# Patient Record
Sex: FEMALE | Race: BLACK OR AFRICAN AMERICAN | NOT HISPANIC OR LATINO | Employment: FULL TIME
[De-identification: names, ages, dates, MRNs, and addresses within clinical notes are randomized per-mention and may not be internally consistent; named-entity substitution may affect disease eponyms.]

---

## 2018-01-07 ENCOUNTER — HEALTH MAINTENANCE LETTER (OUTPATIENT)
Age: 53
End: 2018-01-07

## 2018-11-29 ENCOUNTER — TRANSFERRED RECORDS (OUTPATIENT)
Dept: HEALTH INFORMATION MANAGEMENT | Facility: CLINIC | Age: 53
End: 2018-11-29

## 2018-11-29 LAB
HPV ABSTRACT: NORMAL
PAP-ABSTRACT: NORMAL
TSH SERPL-ACNC: 0.93 UIU/ML (ref 0.36–3.74)

## 2021-07-13 ENCOUNTER — RECORDS - HEALTHEAST (OUTPATIENT)
Dept: ADMINISTRATIVE | Facility: CLINIC | Age: 56
End: 2021-07-13

## 2021-07-21 ENCOUNTER — RECORDS - HEALTHEAST (OUTPATIENT)
Dept: ADMINISTRATIVE | Facility: CLINIC | Age: 56
End: 2021-07-21

## 2021-08-27 ENCOUNTER — LAB (OUTPATIENT)
Dept: FAMILY MEDICINE | Facility: CLINIC | Age: 56
End: 2021-08-27
Payer: COMMERCIAL

## 2021-08-27 DIAGNOSIS — Z20.822 ENCOUNTER FOR LABORATORY TESTING FOR COVID-19 VIRUS: Primary | ICD-10-CM

## 2021-08-27 LAB — SARS-COV-2 RNA RESP QL NAA+PROBE: NEGATIVE

## 2021-08-27 PROCEDURE — U0003 INFECTIOUS AGENT DETECTION BY NUCLEIC ACID (DNA OR RNA); SEVERE ACUTE RESPIRATORY SYNDROME CORONAVIRUS 2 (SARS-COV-2) (CORONAVIRUS DISEASE [COVID-19]), AMPLIFIED PROBE TECHNIQUE, MAKING USE OF HIGH THROUGHPUT TECHNOLOGIES AS DESCRIBED BY CMS-2020-01-R: HCPCS

## 2021-08-27 PROCEDURE — U0005 INFEC AGEN DETEC AMPLI PROBE: HCPCS

## 2022-01-06 ENCOUNTER — LAB (OUTPATIENT)
Dept: LAB | Facility: CLINIC | Age: 57
End: 2022-01-06
Payer: COMMERCIAL

## 2022-01-06 DIAGNOSIS — Z20.822 EXPOSURE TO 2019 NOVEL CORONAVIRUS: Primary | ICD-10-CM

## 2022-01-06 LAB — SARS-COV-2 RNA RESP QL NAA+PROBE: POSITIVE

## 2022-01-06 PROCEDURE — U0003 INFECTIOUS AGENT DETECTION BY NUCLEIC ACID (DNA OR RNA); SEVERE ACUTE RESPIRATORY SYNDROME CORONAVIRUS 2 (SARS-COV-2) (CORONAVIRUS DISEASE [COVID-19]), AMPLIFIED PROBE TECHNIQUE, MAKING USE OF HIGH THROUGHPUT TECHNOLOGIES AS DESCRIBED BY CMS-2020-01-R: HCPCS

## 2022-01-06 PROCEDURE — U0005 INFEC AGEN DETEC AMPLI PROBE: HCPCS

## 2022-01-07 ENCOUNTER — TELEPHONE (OUTPATIENT)
Dept: LAB | Facility: CLINIC | Age: 57
End: 2022-01-07
Payer: COMMERCIAL

## 2022-01-07 NOTE — TELEPHONE ENCOUNTER
"Coronavirus (COVID-19) Notification    Caller Name (Patient, parent, daughter/son, grandparent, etc)  Patient    Reason for call  Notify of Positive Coronavirus (COVID-19) lab results, assess symptoms,  review  [facility] recommendations    Lab Result    Lab test:  2019-nCoV rRt-PCR or SARS-CoV-2 PCR    Oropharyngeal AND/OR nasopharyngeal swabs is POSITIVE for 2019-nCoV RNA/SARS-COV-2 PCR (COVID-19 virus)    RN Recommendations/Instructions per [facility] Coronavirus COVID-19 recommendations    Brief introduction script  Introduce self then review script:  \"I am calling on behalf of [email or web address].  We were notified that your Coronavirus test (COVID-19) for was POSITIVE for the virus.  I have some information to relay to you but first I wanted to mention that the [place] Dept of Health will be contacting you shortly [it's possible MD already called Patient] to talk to you more about how you are feeling and other people you have had contact with who might now also have the virus.  Also,  [facility] is Partnering with the [facility] for Covid-19 research, you may be contacted directly by research staff.\"    Assessment (Inquire about Patient's current symptoms)   Assessment   Current Symptoms at time of phone call: (if no symptoms, document No symptoms] Sore throat cough and headache   Symptoms onset (if applicable) 01/6/22     If at time of call, Patients symptoms hare worsened, the Patient should contact 911 or have someone drive them to Emergency Dept promptly:      If Patient calling 911, inform 911 personal that you have tested positive for the Coronavirus (COVID-19).  Place mask on and await 911 to arrive.    If Emergency Dept, If possible, please have another adult drive you to the Emergency Dept but you need to wear mask when in contact with other people.      Monoclonal Antibody Administration    You may be eligible to receive a new treatment with a monoclonal antibody for preventing " hospitalization in patients at high risk for complications from COVID-19.   This medication is still experimental and available on a limited basis; it is given through an IV and must be given at an infusion center. Please note that not all people who are eligible will receive the medication since it is in limited supply.     Are you interested in being considered for this medication?  No.   Does the patient fit the criteria: Patient declined    Review information with Patient    Your result was positive. This means you have COVID-19 (coronavirus).  We have sent you a letter that reviews the information that I'll be reviewing with you now.    How can I protect others?    If you have symptoms: stay home and away from others (self-isolate) until:    You've had no fever--and no medicine that reduces fever--for 1 full day (24 hours). And       Your other symptoms have gotten better. For example, your cough or breathing has improved. And     At least 10 days have passed since your symptoms started. (If you've been told by a doctor that you have a weak immune system, wait 20 days.)     If you don't have symptoms: Stay home and away from others (self-isolate) until at least 10 days have passed since your first positive COVID-19 test. (Date test collected)    During this time:    Stay in your own room, including for meals. Use your own bathroom if you can.    Stay away from others in your home. No hugging, kissing or shaking hands. No visitors.     Don't go to work, school or anywhere else.     Clean  high touch  surfaces often (doorknobs, counters, handles, etc.). Use a household cleaning spray or wipes. You'll find a full list on the EPA website at www.epa.gov/pesticide-registration/list-n-disinfectants-use-against-sars-cov-2.     Cover your mouth and nose with a mask, tissue or other face covering to avoid spreading germs.    Wash your hands and face often with soap and water.    Make a list of people you have been in  close contact with recently, even if either of you wore a face covering.   - Start your list from 2 days before you became ill or had a positive test.  - Include anyone that was within 6 feet of you for a cumulative total of 15 minutes or more in 24 hours. (Example: if you sat next to Venkat for 5 minutes in the morning and 10 minutes in the afternoon, then you were in close contact for 15 minutes total that day. Venkat would be added to your list.)    A public health worker will call or text you. It is important that you answer. They will ask you questions about possible exposures to COVID-19, such as people you have been in direct contact with and places you have visited.    Tell the people on your list that you have COVID-19; they should stay away from others for 14 days starting from the last time they were in contact with you (unless you are told something different from a public health worker).     Caregivers in these groups are at risk for severe illness due to COVID-19:  o People 65 years and older  o People who live in a nursing home or long-term care facility  o People with chronic disease (lung, heart, cancer, diabetes, kidney, liver, immunologic)  o People who have a weakened immune system, including those who:  - Are in cancer treatment  - Take medicine that weakens the immune system, such as corticosteroids  - Had a bone marrow or organ transplant  - Have an immune deficiency  - Have poorly controlled HIV or AIDS  - Are obese (body mass index of 40 or higher)  - Smoke regularly    Caregivers should wear gloves while washing dishes, handling laundry and cleaning bedrooms and bathrooms.    Wash and dry laundry with special caution. Don't shake dirty laundry, and use the warmest water setting you can.    If you have a weakened immune system, ask your doctor about other actions you should take.    For more tips, go to www.cdc.gov/coronavirus/2019-ncov/downloads/10Things.pdf.    You should not go back to work  until you meet the guidelines above for ending your home isolation. You don't need to be retested for COVID-19 before going back to work--studies show that you won't spread the virus if it's been at least 10 days since your symptoms started (or 20 days, if you have a weak immune system).    Employers: This document serves as formal notice of your employee's medical guidelines for going back to work. They must meet the above guidelines before going back to work in person.    How can I take care of myself?    1. Get lots of rest. Drink extra fluids (unless a doctor has told you not to).    2. Take Tylenol (acetaminophen) for fever or pain. If you have liver or kidney problems, ask your family doctor if it's okay to take Tylenol.     Take either:     650 mg (two 325 mg pills) every 4 to 6 hours, or     1,000 mg (two 500 mg pills) every 8 hours as needed.     Note: Don't take more than 3,000 mg in one day. Acetaminophen is found in many medicines (both prescribed and over-the-counter medicines). Read all labels to be sure you don't take too much.    For children, check the Tylenol bottle for the right dose (based on their age or weight).    3. If you have other health problems (like cancer, heart failure, an organ transplant or severe kidney disease): Call your specialty clinic if you don't feel better in the next 2 days.    4. Know when to call 911: Emergency warning signs include:    Trouble breathing or shortness of breath    Pain or pressure in the chest that doesn't go away    Feeling confused like you haven't felt before, or not being able to wake up    Bluish-colored lips or face    5. Sign up for [employer]. We know it's scary to hear that you have COVID-19. We want to track your symptoms to make sure you're okay over the next 2 weeks. Please look for an email from [employer]--this is a free, online program that we'll use to keep in touch. To sign up, follow the link in the email. Learn more at  [email or web address].    Where can I get more information?    [facility]: [email or web address]    Coronavirus Basics: www.health.[facility].[place]/diseases/coronavirus/basics.html    What to Do If You're Sick: www.cdc.gov/coronavirus/2019-ncov/about/steps-when-sick.html    Ending Home Isolation: www.cdc.gov/coronavirus/2019-ncov/hcp/disposition-in-home-patients.html     Caring for Someone with COVID-19: www.cdc.gov/coronavirus/2019-ncov/if-you-are-sick/care-for-someone.html     [facility] clinical trials (COVID-19 research studies): clinicalaffairs.[facility]/n-clinical-trials     A Positive COVID-19 letter will be sent via [employer] or the mail. (Exception, no letters sent to Presurgerical/Preprocedure Patients)    Priti Sabillno LPN

## 2022-01-13 DIAGNOSIS — Z20.822 ENCOUNTER FOR LABORATORY TESTING FOR COVID-19 VIRUS: Primary | ICD-10-CM

## 2022-01-17 ENCOUNTER — LAB (OUTPATIENT)
Dept: LAB | Facility: CLINIC | Age: 57
End: 2022-01-17
Payer: COMMERCIAL

## 2022-01-17 DIAGNOSIS — Z20.822 ENCOUNTER FOR LABORATORY TESTING FOR COVID-19 VIRUS: ICD-10-CM

## 2022-01-17 LAB — SARS-COV-2 RNA RESP QL NAA+PROBE: NEGATIVE

## 2022-01-17 PROCEDURE — U0005 INFEC AGEN DETEC AMPLI PROBE: HCPCS

## 2022-01-17 PROCEDURE — U0003 INFECTIOUS AGENT DETECTION BY NUCLEIC ACID (DNA OR RNA); SEVERE ACUTE RESPIRATORY SYNDROME CORONAVIRUS 2 (SARS-COV-2) (CORONAVIRUS DISEASE [COVID-19]), AMPLIFIED PROBE TECHNIQUE, MAKING USE OF HIGH THROUGHPUT TECHNOLOGIES AS DESCRIBED BY CMS-2020-01-R: HCPCS

## 2022-04-25 ENCOUNTER — TELEPHONE (OUTPATIENT)
Dept: FAMILY MEDICINE | Facility: CLINIC | Age: 57
End: 2022-04-25
Payer: COMMERCIAL

## 2022-04-25 NOTE — TELEPHONE ENCOUNTER
Reason for Call:  Other appointment    Detailed comments: patient called and would like to schedule an appointment to est new care with Mirlande Connelly at [facility].  Provider approval needed.  Please contact patient.  Thank you.    Phone Number Patient can be reached at: Home number on file [phone] (home)    Best Time: any    Can we leave a detailed message on this number? YES    Call taken on 4/25/2022 at 9:55 AM by Nohemy Ferguson

## 2022-06-09 ENCOUNTER — TELEPHONE (OUTPATIENT)
Dept: FAMILY MEDICINE | Facility: CLINIC | Age: 57
End: 2022-06-09

## 2022-06-09 ENCOUNTER — OFFICE VISIT (OUTPATIENT)
Dept: FAMILY MEDICINE | Facility: CLINIC | Age: 57
End: 2022-06-09
Payer: COMMERCIAL

## 2022-06-09 VITALS
SYSTOLIC BLOOD PRESSURE: 162 MMHG | HEIGHT: 65 IN | HEART RATE: 109 BPM | BODY MASS INDEX: 42.69 KG/M2 | OXYGEN SATURATION: 98 % | TEMPERATURE: 98 F | WEIGHT: 256.25 LBS | DIASTOLIC BLOOD PRESSURE: 96 MMHG

## 2022-06-09 DIAGNOSIS — Z11.4 SCREENING FOR HIV (HUMAN IMMUNODEFICIENCY VIRUS): ICD-10-CM

## 2022-06-09 DIAGNOSIS — G43.809 MIGRAINE VARIANT: ICD-10-CM

## 2022-06-09 DIAGNOSIS — Z00.00 ROUTINE GENERAL MEDICAL EXAMINATION AT A HEALTH CARE FACILITY: Primary | ICD-10-CM

## 2022-06-09 DIAGNOSIS — Z12.31 VISIT FOR SCREENING MAMMOGRAM: ICD-10-CM

## 2022-06-09 DIAGNOSIS — E66.01 MORBID OBESITY (H): ICD-10-CM

## 2022-06-09 DIAGNOSIS — Z12.11 SCREEN FOR COLON CANCER: ICD-10-CM

## 2022-06-09 DIAGNOSIS — Z13.220 SCREENING FOR HYPERLIPIDEMIA: ICD-10-CM

## 2022-06-09 DIAGNOSIS — Z12.4 CERVICAL CANCER SCREENING: ICD-10-CM

## 2022-06-09 DIAGNOSIS — R03.0 ELEVATED BLOOD PRESSURE READING WITHOUT DIAGNOSIS OF HYPERTENSION: ICD-10-CM

## 2022-06-09 DIAGNOSIS — R73.03 PREDIABETES: ICD-10-CM

## 2022-06-09 DIAGNOSIS — Z11.59 NEED FOR HEPATITIS C SCREENING TEST: ICD-10-CM

## 2022-06-09 LAB
ALBUMIN SERPL-MCNC: 4 G/DL (ref 3.5–5)
ALP SERPL-CCNC: 83 U/L (ref 45–120)
ALT SERPL W P-5'-P-CCNC: 26 U/L (ref 0–45)
ANION GAP SERPL CALCULATED.3IONS-SCNC: 12 MMOL/L (ref 5–18)
AST SERPL W P-5'-P-CCNC: 17 U/L (ref 0–40)
BILIRUB SERPL-MCNC: 0.5 MG/DL (ref 0–1)
BUN SERPL-MCNC: 15 MG/DL (ref 8–22)
CALCIUM SERPL-MCNC: 9.7 MG/DL (ref 8.5–10.5)
CHLORIDE BLD-SCNC: 108 MMOL/L (ref 98–107)
CHOLEST SERPL-MCNC: 240 MG/DL
CO2 SERPL-SCNC: 24 MMOL/L (ref 22–31)
CREAT SERPL-MCNC: 0.86 MG/DL (ref 0.6–1.1)
FASTING STATUS PATIENT QL REPORTED: ABNORMAL
GFR SERPL CREATININE-BSD FRML MDRD: 78 ML/MIN/1.73M2
GLUCOSE BLD-MCNC: 107 MG/DL (ref 70–125)
HBA1C MFR BLD: 5.7 % (ref 0–5.6)
HDLC SERPL-MCNC: 61 MG/DL
HIV 1+2 AB+HIV1 P24 AG SERPL QL IA: NEGATIVE
LDLC SERPL CALC-MCNC: 152 MG/DL
POTASSIUM BLD-SCNC: 4.2 MMOL/L (ref 3.5–5)
PROT SERPL-MCNC: 7.5 G/DL (ref 6–8)
SODIUM SERPL-SCNC: 144 MMOL/L (ref 136–145)
TRIGL SERPL-MCNC: 136 MG/DL

## 2022-06-09 PROCEDURE — 86803 HEPATITIS C AB TEST: CPT | Performed by: FAMILY MEDICINE

## 2022-06-09 PROCEDURE — 83036 HEMOGLOBIN GLYCOSYLATED A1C: CPT | Performed by: FAMILY MEDICINE

## 2022-06-09 PROCEDURE — 80053 COMPREHEN METABOLIC PANEL: CPT | Performed by: FAMILY MEDICINE

## 2022-06-09 PROCEDURE — 36415 COLL VENOUS BLD VENIPUNCTURE: CPT | Performed by: FAMILY MEDICINE

## 2022-06-09 PROCEDURE — 90471 IMMUNIZATION ADMIN: CPT | Performed by: FAMILY MEDICINE

## 2022-06-09 PROCEDURE — 87624 HPV HI-RISK TYP POOLED RSLT: CPT | Performed by: FAMILY MEDICINE

## 2022-06-09 PROCEDURE — 87389 HIV-1 AG W/HIV-1&-2 AB AG IA: CPT | Performed by: FAMILY MEDICINE

## 2022-06-09 PROCEDURE — 90715 TDAP VACCINE 7 YRS/> IM: CPT | Performed by: FAMILY MEDICINE

## 2022-06-09 PROCEDURE — 80061 LIPID PANEL: CPT | Performed by: FAMILY MEDICINE

## 2022-06-09 PROCEDURE — G0124 SCREEN C/V THIN LAYER BY MD: HCPCS | Performed by: PATHOLOGY

## 2022-06-09 PROCEDURE — 99386 PREV VISIT NEW AGE 40-64: CPT | Mod: 25 | Performed by: FAMILY MEDICINE

## 2022-06-09 PROCEDURE — G0123 SCREEN CERV/VAG THIN LAYER: HCPCS | Performed by: FAMILY MEDICINE

## 2022-06-09 ASSESSMENT — ENCOUNTER SYMPTOMS
PALPITATIONS: 0
HEMATOCHEZIA: 0
HEARTBURN: 0
JOINT SWELLING: 0
NAUSEA: 0
MYALGIAS: 0
ABDOMINAL PAIN: 0
COUGH: 0
WEAKNESS: 0
CHILLS: 0
DYSURIA: 0
SHORTNESS OF BREATH: 0
DIZZINESS: 0
SORE THROAT: 0
DIARRHEA: 0
HEMATURIA: 0
PARESTHESIAS: 0
FREQUENCY: 0
ARTHRALGIAS: 0
NERVOUS/ANXIOUS: 0
FEVER: 0
EYE PAIN: 0
BREAST MASS: 0
CONSTIPATION: 0
HEADACHES: 0

## 2022-06-09 NOTE — TELEPHONE ENCOUNTER
"  Called pt and left voice mail   \"okay to relay message\"                    ----- Message from Mirlande Rojas MD sent at 6/9/2022 12:11 PM CDT -----  Please let Edith know that her lab result shows pre-diabetes.  I'll put in a referral for diabetes education to learn about how to prevent diabetes and get a glucometer.  Please schedule an appointment to follow up prediabetes with me. Will discuss option of medication.    "

## 2022-06-09 NOTE — PATIENT INSTRUCTIONS
High Blood Pressure = Hypertension  Blood pressure goal is less than 140/90.  BP Readings from Last 3 Encounters:   06/09/22 (!) 162/96   11/07/14 133/83      To lower blood pressure:  Eat 5 servings of fruit + veggies every day.  Eat low salt diet.  Exercise 30 minutes, most days.  Lose 5-10% of your weight.         Preventive Health Recommendations  Female Ages 50 - 64    Yearly exam: See your health care provider every year in order to  Review health changes.   Discuss preventive care.    Review your medicines if your doctor has prescribed any.    Get a Pap test every three years (unless you have an abnormal result and your provider advises testing more often).  If you get Pap tests with HPV test, you only need to test every 5 years, unless you have an abnormal result.   You do not need a Pap test if your uterus was removed (hysterectomy) and you have not had cancer.  You should be tested each year for STDs (sexually transmitted diseases) if you're at risk.   Have a mammogram every 1 to 2 years.  Have a colonoscopy at age 50, or have a yearly FIT test (stool test). These exams screen for colon cancer.    Have a cholesterol test every 5 years, or more often if advised.  Have a diabetes test (fasting glucose) every three years. If you are at risk for diabetes, you should have this test more often.   If you are at risk for osteoporosis (brittle bone disease), think about having a bone density scan (DEXA).    Shots: Get a flu shot each year. Get a tetanus shot every 10 years.    Nutrition:   Eat at least 5 servings of fruits and vegetables each day.  Eat whole-grain bread, whole-wheat pasta and brown rice instead of white grains and rice.  Get adequate Calcium and Vitamin D.     Lifestyle  Exercise at least 150 minutes a week (30 minutes a day, 5 days a week). This will help you control your weight and prevent disease.  Limit alcohol to one drink per day.  No smoking.   Wear sunscreen to prevent skin cancer.   See  your dentist every six months for an exam and cleaning.  See your eye doctor every 1 to 2 years.

## 2022-06-09 NOTE — PROGRESS NOTES
SUBJECTIVE:   CC: Edith Reynolds is an 57 year old woman who presents for preventive health visit.     Mom  of breast cancer 3/28/22. Born 1948 - 76 yo.  Niece - sister's daughter - hysterectomy for cancer.    11 grandkids.     Healthy Habits:     Getting at least 3 servings of Calcium per day:  Yes    Bi-annual eye exam:  NO    Dental care twice a year:  Yes    Sleep apnea or symptoms of sleep apnea:  None    Diet:  Regular (no restrictions)    Frequency of exercise:  None    Taking medications regularly:  Not Applicable    Barriers to taking medications:  Not applicable    Medication side effects:  Not applicable    PHQ-2 Total Score: 0    Additional concerns today:  No      Today's PHQ-2 Score:   PHQ-2 (  [employer]) 2022   Q1: Little interest or pleasure in doing things 0   Q2: Feeling down, depressed or hopeless 0   PHQ-2 Score 0   Q1: Little interest or pleasure in doing things Not at all   Q2: Feeling down, depressed or hopeless Not at all   PHQ-2 Score 0       Abuse: Current or Past (Physical, Sexual or Emotional) - No  Do you feel safe in your environment? Yes    Have you ever done Advance Care Planning? (For example, a Health Directive, POLST, or a discussion with a medical provider or your loved ones about your wishes): No, advance care planning information given to patient to review.  Patient declined advance care planning discussion at this time.    Social History     Tobacco Use     Smoking status: Never Smoker     Smokeless tobacco: Never Used   Substance Use Topics     Alcohol use: Yes     Comment: Occasional     If you drink alcohol do you typically have >3 drinks per day or >7 drinks per week? No    Alcohol Use 2022   Prescreen: >3 drinks/day or >7 drinks/week? No   Prescreen: >3 drinks/day or >7 drinks/week? -   No flowsheet data found.    Reviewed orders with patient.  Reviewed health maintenance and updated orders accordingly - Yes    PAP / HPV Latest Ref Rng & Units 2022  "11/7/2014   PAP   Low-grade squamous intraepithelial lesion (LSIL) encompassing HPV/mild dysplasia/CIN1(A) LSIL encompassing HPV/mild dysplasia/CIN1  Electronically signed by Mendoza Mclain MD on 11/19/2014 at  8:30 AM     HPV16 Negative Negative -   HPV18 Negative Negative -   HRHPV Negative Negative -     Reviewed and updated as needed this visit by clinical staff   Tobacco  Allergies  Meds  Problems  Med Hx  Surg Hx  Fam Hx            Reviewed and updated as needed this visit by Provider      Problems  Med Hx  Surg Hx  Fam Hx           Review of Systems   Constitutional: Negative for chills and fever.   HENT: Negative for congestion, ear pain, hearing loss and sore throat.    Eyes: Negative for pain and visual disturbance.   Respiratory: Negative for cough and shortness of breath.    Cardiovascular: Negative for chest pain, palpitations and peripheral edema.   Gastrointestinal: Negative for abdominal pain, constipation, diarrhea, heartburn, hematochezia and nausea.   Breasts:  Negative for tenderness, breast mass and discharge.   Genitourinary: Negative for dysuria, frequency, genital sores, hematuria, pelvic pain, urgency, vaginal bleeding and vaginal discharge.   Musculoskeletal: Negative for arthralgias, joint swelling and myalgias.   Skin: Negative for rash.   Neurological: Negative for dizziness, weakness, headaches and paresthesias.   Psychiatric/Behavioral: Negative for mood changes. The patient is not nervous/anxious.         OBJECTIVE:   BP (!) 162/96 (BP Location: Left arm, Patient Position: Sitting, Cuff Size: Adult Large)   Pulse 109   Temp 98  F (36.7  C)   Ht 1.639 m (5' 4.53\")   Wt 116.2 kg (256 lb 4 oz)   LMP  (LMP Unknown)   SpO2 98%   BMI 43.27 kg/m    Physical Exam  GENERAL APPEARANCE: healthy, alert and no distress  EYES: Eyes grossly normal to inspection, PERRL and conjunctivae and sclerae normal  HENT: ear canals and TM's normal, nose and mouth without ulcers or " lesions, oropharynx clear and oral mucous membranes moist  NECK: no adenopathy, no asymmetry, masses, or scars and thyroid normal to palpation  RESP: lungs clear to auscultation - no rales, rhonchi or wheezes  BREAST: normal without masses, tenderness or nipple discharge and no palpable axillary masses or adenopathy  CV: regular rate and rhythm, normal S1 S2, no S3 or S4, no murmur, click or rub, no peripheral edema and peripheral pulses strong  ABDOMEN: soft, nontender, no hepatosplenomegaly, no masses and bowel sounds normal   (female): normal female external genitalia, normal urethral meatus, vaginal mucosal atrophy noted, normal cervix, adnexae, and uterus without masses or abnormal discharge  MS: no musculoskeletal defects are noted and gait is age appropriate without ataxia  SKIN: no suspicious lesions or rashes  NEURO: Normal strength and tone, sensory exam grossly normal, mentation intact and speech normal  PSYCH: mentation appears normal and affect normal/bright    Diagnostic Test Results:  Labs reviewed in Epic    ASSESSMENT/PLAN:   Edith was seen today for establish care and physical.    Diagnoses and all orders for this visit:    Routine general medical examination at a health care facility  -     Hemoglobin A1c; Future  -     Comprehensive metabolic panel (BMP + Alb, Alk Phos, ALT, AST, Total. Bili, TP); Future  -     Hemoglobin A1c  -     Comprehensive metabolic panel (BMP + Alb, Alk Phos, ALT, AST, Total. Bili, TP)    Screen for colon cancer  -     Adult Gastro Ref - Procedure Only; Future  -     Cancel: Adult Gastro Ref - Procedure Only; Future    Screening for HIV (human immunodeficiency virus)  -     HIV Antigen Antibody Combo; Future  -     HIV Antigen Antibody Combo    Need for hepatitis C screening test  -     Hepatitis C Screen Reflex to HCV RNA Quant and Genotype; Future  -     Hepatitis C Screen Reflex to HCV RNA Quant and Genotype    Visit for screening mammogram  -     MA SCREENING DIGITAL  "BILAT - Future  (s+30); Future    Cervical cancer screening  -     PAP screen with HPV - recommended age 30 - 65 years  -     HPV High Risk Types DNA Cervical    Screening for hyperlipidemia  -     Lipid panel reflex to direct LDL Fasting; Future  -     Lipid panel reflex to direct LDL Fasting    Migraine variant    Elevated blood pressure reading without diagnosis of hypertension    Morbid obesity (H)    Prediabetes  -     AMB Adult Diabetes Educator Referral; Future    Other orders  -     REVIEW OF HEALTH MAINTENANCE PROTOCOL ORDERS  -     ZOSTER VACCINE RECOMBINANT ADJUVANTED (SHINGRIX); Future  -     TDAP VACCINE (Adacel, Boostrix); Future  -     Cancel: COVID-19,PF,PFIZER (12+ YRS); Future  -     TDAP VACCINE (Adacel, Boostrix)        COUNSELING:  Reviewed preventive health counseling, as reflected in patient instructions    Estimated body mass index is 43.27 kg/m  as calculated from the following:    Height as of this encounter: 1.639 m (5' 4.53\").    Weight as of this encounter: 116.2 kg (256 lb 4 oz).    Weight management plan: Discussed healthy diet and exercise guidelines    She reports that she has never smoked. She has never used smokeless tobacco.      Counseling Resources:  ATP IV Guidelines  Pooled Cohorts Equation Calculator  Breast Cancer Risk Calculator  BRCA-Related Cancer Risk Assessment: FHS-7 Tool  FRAX Risk Assessment  ICSI Preventive Guidelines  Dietary Guidelines for Americans, 2010  USDA's MyPlate  ASA Prophylaxis  Lung CA Screening    Mirlande Rojas MD  [facility]  "

## 2022-06-10 LAB — HCV AB SERPL QL IA: NONREACTIVE

## 2022-06-13 LAB
HUMAN PAPILLOMA VIRUS 16 DNA: NEGATIVE
HUMAN PAPILLOMA VIRUS 18 DNA: NEGATIVE
HUMAN PAPILLOMA VIRUS FINAL DIAGNOSIS: NORMAL
HUMAN PAPILLOMA VIRUS OTHER HR: NEGATIVE

## 2022-06-14 NOTE — TELEPHONE ENCOUNTER
Pt called back and I relay msg below as indicated. Pt have no further questions or at concerns at the moment.

## 2022-06-15 LAB
BKR LAB AP GYN ADEQUACY: ABNORMAL
BKR LAB AP GYN INTERPRETATION: ABNORMAL
BKR LAB AP HPV REFLEX: ABNORMAL
BKR LAB AP PREVIOUS ABNORMAL: ABNORMAL
PATH REPORT.COMMENTS IMP SPEC: ABNORMAL
PATH REPORT.COMMENTS IMP SPEC: ABNORMAL
PATH REPORT.RELEVANT HX SPEC: ABNORMAL

## 2022-06-17 ENCOUNTER — PATIENT OUTREACH (OUTPATIENT)
Dept: FAMILY MEDICINE | Facility: CLINIC | Age: 57
End: 2022-06-17
Payer: COMMERCIAL

## 2022-06-20 ENCOUNTER — ANCILLARY PROCEDURE (OUTPATIENT)
Dept: MAMMOGRAPHY | Facility: CLINIC | Age: 57
End: 2022-06-20
Attending: FAMILY MEDICINE
Payer: COMMERCIAL

## 2022-06-20 DIAGNOSIS — Z12.31 VISIT FOR SCREENING MAMMOGRAM: ICD-10-CM

## 2022-06-20 PROCEDURE — 77067 SCR MAMMO BI INCL CAD: CPT | Mod: TC | Performed by: RADIOLOGY

## 2022-07-15 ENCOUNTER — OFFICE VISIT (OUTPATIENT)
Dept: FAMILY MEDICINE | Facility: CLINIC | Age: 57
End: 2022-07-15
Payer: COMMERCIAL

## 2022-07-15 VITALS
OXYGEN SATURATION: 97 % | TEMPERATURE: 97.6 F | DIASTOLIC BLOOD PRESSURE: 106 MMHG | HEART RATE: 93 BPM | SYSTOLIC BLOOD PRESSURE: 170 MMHG | RESPIRATION RATE: 20 BRPM

## 2022-07-15 DIAGNOSIS — R87.612 PAPANICOLAOU SMEAR OF CERVIX WITH LOW GRADE SQUAMOUS INTRAEPITHELIAL LESION (LGSIL): Primary | ICD-10-CM

## 2022-07-15 PROCEDURE — 88305 TISSUE EXAM BY PATHOLOGIST: CPT | Mod: 59 | Performed by: PATHOLOGY

## 2022-07-15 PROCEDURE — 57456 ENDOCERV CURETTAGE W/SCOPE: CPT | Performed by: FAMILY MEDICINE

## 2022-07-15 NOTE — PROGRESS NOTES
GYN: Colposcopy/LEEP    Date/Time: 7/15/2022 8:30 AM  Performed by: Maggie Kim MD  Authorized by: Maggie Kim MD     Consent:     Consent obtained:  Verbal    Consent given by:  Patient    Procedural risks discussed:  Bleeding    Patient questions answered: yes      Patient agrees, verbalizes understanding, and wants to proceed: yes    Pre-procedure:     Pre-procedure timeout performed: yes      Prepped with: acetic acid    Indication:     Indication:  LSIL  Procedure:     Procedure: Colposcopy w/ endocervical curettage      Under satisfactory analgesia the patient was prepped and draped in the dorsal lithotomy position: yes      Brunswick speculum was placed in the vagina: yes      Under colposcopic examination the transition zone was seen in entirety: yes      Endocervix was curetted using a Kevorkian curette: yes      Specimen to pathology: yes    Post-procedure:     Findings: Negative      Impression: Normal appearing cervix        ASSESSMENT / PLAN:  1. Papanicolaou smear of cervix with low grade squamous intraepithelial lesion (LGSIL)  This is a 56 yo female here for colposcopy due to recent LSIL pap smear; negative for HPV.  Review of chart suggests same diagnosis in 2014, but had normal pap smear results in between.  No new partners, no other risk factors.  Verbal consent given for colposcopy exam.  See above procedure note.   - GYN: Colposcopy/LEEP  - Surgical Pathology Exam

## 2022-07-19 LAB
PATH REPORT.COMMENTS IMP SPEC: NORMAL
PATH REPORT.FINAL DX SPEC: NORMAL
PATH REPORT.GROSS SPEC: NORMAL
PATH REPORT.MICROSCOPIC SPEC OTHER STN: NORMAL
PATH REPORT.RELEVANT HX SPEC: NORMAL
PHOTO IMAGE: NORMAL

## 2022-08-05 ENCOUNTER — PATIENT OUTREACH (OUTPATIENT)
Dept: FAMILY MEDICINE | Facility: CLINIC | Age: 57
End: 2022-08-05

## 2022-08-29 ENCOUNTER — MYC MEDICAL ADVICE (OUTPATIENT)
Dept: FAMILY MEDICINE | Facility: CLINIC | Age: 57
End: 2022-08-29

## 2022-08-29 NOTE — TELEPHONE ENCOUNTER
Please schedule Edith for a BP check.    BP Readings from Last 6 Encounters:   07/15/22 (!) 170/106   06/09/22 (!) 162/96   11/07/14 133/83

## 2022-09-29 ENCOUNTER — IMMUNIZATION (OUTPATIENT)
Dept: FAMILY MEDICINE | Facility: CLINIC | Age: 57
End: 2022-09-29
Payer: COMMERCIAL

## 2022-09-29 PROCEDURE — 90686 IIV4 VACC NO PRSV 0.5 ML IM: CPT

## 2022-09-29 PROCEDURE — 90471 IMMUNIZATION ADMIN: CPT

## 2022-12-21 RX ORDER — MEDROXYPROGESTERONE ACETATE 150 MG/ML
150 INJECTION, SUSPENSION INTRAMUSCULAR
Status: CANCELLED | OUTPATIENT
Start: 2022-12-21 | End: 2023-12-16

## 2023-02-08 ENCOUNTER — LAB REQUISITION (OUTPATIENT)
Dept: LAB | Facility: CLINIC | Age: 58
End: 2023-02-08

## 2023-02-08 PROCEDURE — 88342 IMHCHEM/IMCYTCHM 1ST ANTB: CPT | Mod: TC | Performed by: DENTIST

## 2023-05-10 ENCOUNTER — PATIENT OUTREACH (OUTPATIENT)
Dept: CARE COORDINATION | Facility: CLINIC | Age: 58
End: 2023-05-10
Payer: COMMERCIAL

## 2023-05-24 ENCOUNTER — PATIENT OUTREACH (OUTPATIENT)
Dept: CARE COORDINATION | Facility: CLINIC | Age: 58
End: 2023-05-24
Payer: COMMERCIAL

## 2023-05-24 ENCOUNTER — MYC MEDICAL ADVICE (OUTPATIENT)
Dept: FAMILY MEDICINE | Facility: CLINIC | Age: 58
End: 2023-05-24
Payer: COMMERCIAL

## 2023-05-24 DIAGNOSIS — E66.01 MORBID OBESITY (H): ICD-10-CM

## 2023-05-24 DIAGNOSIS — Z12.11 SCREEN FOR COLON CANCER: ICD-10-CM

## 2023-05-24 DIAGNOSIS — R20.0 BILATERAL HAND NUMBNESS: Primary | ICD-10-CM

## 2023-05-24 DIAGNOSIS — R73.03 PREDIABETES: ICD-10-CM

## 2023-05-24 DIAGNOSIS — R03.0 ELEVATED BLOOD PRESSURE READING WITHOUT DIAGNOSIS OF HYPERTENSION: ICD-10-CM

## 2023-05-25 NOTE — TELEPHONE ENCOUNTER
Please help her schedule a lab visit now and a physical to follow.    No future appointments.   Health Maintenance Due   Topic Date Due     COLORECTAL CANCER SCREENING  Never done     ZOSTER IMMUNIZATION (1 of 2) Never done     COVID-19 Vaccine (4 - Pfizer series) 03/02/2022     PHQ-2 (once per calendar year)  01/01/2023     YEARLY PREVENTIVE VISIT  06/09/2023     PAP FOLLOW-UP  07/15/2023     HPV FOLLOW-UP  07/15/2023     BP Readings from Last 3 Encounters:   07/15/22 (!) 170/106   06/09/22 (!) 162/96   11/07/14 133/83     Diagnoses and all orders for this visit:    Bilateral hand numbness  -     Adult Neurology  Referral; Future  -     TSH with free T4 reflex; Future  -     Vitamin B12; Future    BMI 43 (H)    Elevated blood pressure reading without diagnosis of hypertension  -     Renal panel; Future  -     UA reflex to Microscopic - lab collect; Future    Prediabetes  -     Lipid panel reflex to direct LDL Fasting; Future  -     Hemoglobin A1c; Future    Screen for colon cancer  -     Colonoscopy Screening  Referral; Future    Other orders  -     PRIMARY CARE FOLLOW-UP SCHEDULING; Future  -     PRIMARY CARE FOLLOW-UP SCHEDULING; Future  -     ZOSTER VACCINE RECOMBINANT ADJUVANTED (SHINGRIX); Future  -     REVIEW OF HEALTH MAINTENANCE PROTOCOL ORDERS  -     COVID-19 BIVALENT 18+ (MODERNA); Future

## 2023-06-01 ENCOUNTER — OFFICE VISIT (OUTPATIENT)
Dept: NEUROLOGY | Facility: CLINIC | Age: 58
End: 2023-06-01
Attending: FAMILY MEDICINE
Payer: COMMERCIAL

## 2023-06-01 VITALS — RESPIRATION RATE: 16 BRPM | SYSTOLIC BLOOD PRESSURE: 164 MMHG | DIASTOLIC BLOOD PRESSURE: 100 MMHG | HEART RATE: 86 BPM

## 2023-06-01 DIAGNOSIS — R20.0 BILATERAL HAND NUMBNESS: ICD-10-CM

## 2023-06-01 DIAGNOSIS — R73.03 PRE-DIABETES: Primary | ICD-10-CM

## 2023-06-01 PROCEDURE — 99205 OFFICE O/P NEW HI 60 MIN: CPT | Performed by: PSYCHIATRY & NEUROLOGY

## 2023-06-01 NOTE — LETTER
6/1/2023         RE: Edith Reynolds  [address] [facility] [id number]        Dear Colleague,    Thank you for referring your patient, Edith Reynolds, to the [facility] [place]. Please see a copy of my visit note below.    NEUROLOGY OUTPATIENT CONSULT NOTE   Jun 1, 2023     CHIEF COMPLAINT/REASON FOR VISIT/REASON FOR CONSULT  Patient presents with:  Consult For: Bilateral hand numbness     REASON FOR CONSULTATION-hand numbness    REFERRAL SOURCE  Dr. Mirlande Rojas  CC Dr. Mirlande Rojas    HISTORY OF PRESENT ILLNESS  Edith Reynolds is a 58 year old female seen today for evaluation of bilateral hand numbness.  She reports that in 2016/17 she had a EMG because of some numbness in her left hand and needed carpal tunnel surgery.  Over the last few months she has noticed bilateral hand numbness.  She has been dropping things with her left hand.  All 5 fingers are involved.  There is no numbness in the feet.  Denies any significant neck pain or shooting pain down the arms.  Has not gained weight.  Has had a diagnosis of prediabetes for the last 1 year.  She does work on a computer a lot and does sleep at night with the wrist pain.  Denies any damage to the elbows.    Previous history is reviewed and this is unchanged.    PAST MEDICAL/SURGICAL HISTORY  Past Medical History:   Diagnosis Date     Migraine variant 6/9/2022    Resolved after menopause.     Patient Active Problem List   Diagnosis     Elevated blood pressure reading without diagnosis of hypertension     BMI 43 (H)     Prediabetes     Papanicolaou smear of cervix with low grade squamous intraepithelial lesion (LGSIL)   Significant for prediabetes    FAMILY HISTORY  Family History   Problem Relation Age of Onset     Breast Cancer Mother 75     Kidney failure Mother      Coronary Artery Disease Mother      Hypertension Mother      Heart Failure Mother      Cerebrovascular Disease Father 50     Hypertension Sister      No  Known Problems Daughter      Rheumatic fever Son      No Known Problems Son      No Known Problems Half-Brother      Ovarian Cancer No family hx of    Family history negative for neurological problems except for stroke in her father    SOCIAL HISTORY  Social History     Tobacco Use     Smoking status: Never     Smokeless tobacco: Never   Vaping Use     Vaping status: Never Used   Substance Use Topics     Alcohol use: Yes     Comment: Occasional     Drug use: Never       SYSTEMS REVIEW  Twelve-system ROS was done and other than the HPI this was negative.  Pertinent positives noted in the HPI.    MEDICATIONS  No current outpatient medications on file prior to visit.  No current facility-administered medications on file prior to visit.       PHYSICAL EXAMINATION  VITALS: BP (!) 164/100   Pulse 86   Resp 16   LMP  (LMP Unknown)   GENERAL: Healthy appearing, alert, no acute distress, normal habitus.  CARDIOVASCULAR: Extremities warm and well perfused. Pulses present.   NEUROLOGICAL:  Patient is awake and oriented to self, place and time.  Attention span is normal.  Memory is grossly intact.  Language is fluent and follows commands appropriately.  Appropriate fund of knowledge. Cranial nerves 2-12 are intact. There is no pronator drift.  Motor exam shows 5/5 strength in all extremities.  Tone is symmetric bilaterally in upper and lower extremities.  Reflexes are symmetric and 2+ in upper extremities and lower extremities. Sensory exam is grossly intact to light touch, pin prick and vibration.  Finger to nose and heel to shin is without dysmetria.  Romberg is negative.  Gait is normal and the patient is able to do tandem walk and walk on toes and heels.    DIAGNOSTICS  EMG-2017  Showed left median neuropathy.  Diagnosed to have carpal tunnel of the left wrist.    RELEVANT LABS      OUTSIDE RECORDS  Outside referral notes and chart notes were reviewed and pertinent information has been summarized (in addition to the  HPI):-    Plastic surgery notes from 2017 were reviewed.  Neurology notes from 2017 also reviewed.        IMPRESSION/REPORT/PLAN  Pre-diabetes  Bilateral hand numbness  History of left carpal tunnel surgery    This is a 58 year old female with history of left carpal tunnel surgery with new bilateral hand numbness.  Exam today is noncontributory.  She does have risk factors of prediabetes and bending her wrist a lot at work and at nighttime.  Possibly she has recurrence of bilateral carpal tunnel syndrome.    To start we will check an EMG to confirm the diagnosis and to see if she needs surgery.  We will have her use carpal tunnel brace in the meantime.  We will check blood work to look for other causes other than the prediabetes that might increase her risk of getting carpal tunnel.    If testing is negative could consider an MRI of the cervical spine to rule out cervical disc herniation that can sometimes cause bilateral hand numbness.  Exam does not suggest evidence of cervical pathology and will hold off on MRI for right now.    Return back after testing.    -     Protein Immunofixation Serum; Future  -     Protein electrophoresis; Future  -     Vitamin B1 whole blood; Future  -     EMG; Future  -     Miscellaneous Order for DME - ONLY FOR DME  - TSH, B12, HbA1c ordered by primary care    Return for In-Clinic Visit (must), After testing.    Over 60 minutes were spent coordinating the care for the patient on the day of the encounter.  This includes previsit, during visit and post visit activities as documented above.  Counseled patient.  Reviewing outside records.  Reviewing chart/previous blood work.  Testing ordered  (Activities include but not inclusive of reviewing chart, reviewing outside records, reviewing labs and imaging study results as well as the images, patient visit time including getting history and exam,  use if applicable, review of test results with the patient and coming up with a plan  in a shared model, counseling patient and family, education and answering patient questions, EMR , EMR diagnosis entry and problem list management, medication reconciliation and prescription management if applicable, paperwork if applicable, printing documents and documentation of the visit activities.)        Raúl Rosa MD  Neurologist  [facility] Neurology [facility]  Tel:- [phone]    This note was dictated using voice recognition software.  Any grammatical or context distortions are unintentional and inherent to the software.        Again, thank you for allowing me to participate in the care of your patient.        Sincerely,        Raúl Rosa MD

## 2023-06-01 NOTE — NURSING NOTE
Chief Complaint   Patient presents with     Consult For     Bilateral hand numbness     Nataly Tripathi MA,CMA,7:28 AM

## 2023-06-01 NOTE — PROGRESS NOTES
NEUROLOGY OUTPATIENT CONSULT NOTE   Jun 1, 2023     CHIEF COMPLAINT/REASON FOR VISIT/REASON FOR CONSULT  Patient presents with:  Consult For: Bilateral hand numbness     REASON FOR CONSULTATION-hand numbness    REFERRAL SOURCE  Dr. Mirlande Rojas  CC Dr. Mirlande Rojas    HISTORY OF PRESENT ILLNESS  Edith Reynolds is a 58 year old female seen today for evaluation of bilateral hand numbness.  She reports that in 2016/17 she had a EMG because of some numbness in her left hand and needed carpal tunnel surgery.  Over the last few months she has noticed bilateral hand numbness.  She has been dropping things with her left hand.  All 5 fingers are involved.  There is no numbness in the feet.  Denies any significant neck pain or shooting pain down the arms.  Has not gained weight.  Has had a diagnosis of prediabetes for the last 1 year.  She does work on a computer a lot and does sleep at night with the wrist pain.  Denies any damage to the elbows.    Previous history is reviewed and this is unchanged.    PAST MEDICAL/SURGICAL HISTORY  Past Medical History:   Diagnosis Date     Migraine variant 6/9/2022    Resolved after menopause.     Patient Active Problem List   Diagnosis     Elevated blood pressure reading without diagnosis of hypertension     BMI 43 (H)     Prediabetes     Papanicolaou smear of cervix with low grade squamous intraepithelial lesion (LGSIL)   Significant for prediabetes    FAMILY HISTORY  Family History   Problem Relation Age of Onset     Breast Cancer Mother 75     Kidney failure Mother      Coronary Artery Disease Mother      Hypertension Mother      Heart Failure Mother      Cerebrovascular Disease Father 50     Hypertension Sister      No Known Problems Daughter      Rheumatic fever Son      No Known Problems Son      No Known Problems Half-Brother      Ovarian Cancer No family hx of    Family history negative for neurological problems except for stroke in her father    SOCIAL HISTORY  Social History      Tobacco Use     Smoking status: Never     Smokeless tobacco: Never   Vaping Use     Vaping status: Never Used   Substance Use Topics     Alcohol use: Yes     Comment: Occasional     Drug use: Never       SYSTEMS REVIEW  Twelve-system ROS was done and other than the HPI this was negative.  Pertinent positives noted in the HPI.    MEDICATIONS  No current outpatient medications on file prior to visit.  No current facility-administered medications on file prior to visit.       PHYSICAL EXAMINATION  VITALS: BP (!) 164/100   Pulse 86   Resp 16   LMP  (LMP Unknown)   GENERAL: Healthy appearing, alert, no acute distress, normal habitus.  CARDIOVASCULAR: Extremities warm and well perfused. Pulses present.   NEUROLOGICAL:  Patient is awake and oriented to self, place and time.  Attention span is normal.  Memory is grossly intact.  Language is fluent and follows commands appropriately.  Appropriate fund of knowledge. Cranial nerves 2-12 are intact. There is no pronator drift.  Motor exam shows 5/5 strength in all extremities.  Tone is symmetric bilaterally in upper and lower extremities.  Reflexes are symmetric and 2+ in upper extremities and lower extremities. Sensory exam is grossly intact to light touch, pin prick and vibration.  Finger to nose and heel to shin is without dysmetria.  Romberg is negative.  Gait is normal and the patient is able to do tandem walk and walk on toes and heels.    DIAGNOSTICS  EMG-2017  Showed left median neuropathy.  Diagnosed to have carpal tunnel of the left wrist.    RELEVANT LABS      OUTSIDE RECORDS  Outside referral notes and chart notes were reviewed and pertinent information has been summarized (in addition to the HPI):-    Plastic surgery notes from 2017 were reviewed.  Neurology notes from 2017 also reviewed.        IMPRESSION/REPORT/PLAN  Pre-diabetes  Bilateral hand numbness-rule out bilateral carpal tunnel syndrome  History of left carpal tunnel surgery    This is a 58 year  old female with history of left carpal tunnel surgery with new bilateral hand numbness.  Exam today is noncontributory.  She does have risk factors of prediabetes and bending her wrist a lot at work and at nighttime.  Possibly she has recurrence of bilateral carpal tunnel syndrome.    To start we will check an EMG to confirm the diagnosis and to see if she needs surgery.  We will have her use carpal tunnel brace in the meantime.  We will check blood work to look for other causes other than the prediabetes that might increase her risk of getting carpal tunnel.    If testing is negative could consider an MRI of the cervical spine to rule out cervical disc herniation that can sometimes cause bilateral hand numbness.  Exam does not suggest evidence of cervical pathology and will hold off on MRI for right now.    Return back after testing.    -     Protein Immunofixation Serum; Future  -     Protein electrophoresis; Future  -     Vitamin B1 whole blood; Future  -     EMG; Future  -     Miscellaneous Order for DME - ONLY FOR DME  - TSH, B12, HbA1c ordered by primary care    Return for In-Clinic Visit (must), After testing.    Over 60 minutes were spent coordinating the care for the patient on the day of the encounter.  This includes previsit, during visit and post visit activities as documented above.  Counseled patient.  Reviewing outside records.  Reviewing chart/previous blood work.  Testing ordered  (Activities include but not inclusive of reviewing chart, reviewing outside records, reviewing labs and imaging study results as well as the images, patient visit time including getting history and exam,  use if applicable, review of test results with the patient and coming up with a plan in a shared model, counseling patient and family, education and answering patient questions, EMR , EMR diagnosis entry and problem list management, medication reconciliation and prescription management if applicable,  paperwork if applicable, printing documents and documentation of the visit activities.)        Raúl Rosa MD  Neurologist  [facility]  Tel:- [phone]    This note was dictated using voice recognition software.  Any grammatical or context distortions are unintentional and inherent to the software.

## 2023-06-19 ASSESSMENT — ENCOUNTER SYMPTOMS
FEVER: 0
HEMATURIA: 0
PALPITATIONS: 0
SORE THROAT: 0
MYALGIAS: 0
SHORTNESS OF BREATH: 0
JOINT SWELLING: 0
CHILLS: 0
ABDOMINAL PAIN: 0
NERVOUS/ANXIOUS: 0
DIZZINESS: 0
NAUSEA: 0
WEAKNESS: 0
EYE PAIN: 0
FREQUENCY: 0
HEARTBURN: 0
PARESTHESIAS: 0
HEADACHES: 0
ARTHRALGIAS: 0
BREAST MASS: 0
DIARRHEA: 0
CONSTIPATION: 0
COUGH: 0
HEMATOCHEZIA: 0
DYSURIA: 0

## 2023-06-23 ENCOUNTER — OFFICE VISIT (OUTPATIENT)
Dept: FAMILY MEDICINE | Facility: CLINIC | Age: 58
End: 2023-06-23
Payer: COMMERCIAL

## 2023-06-23 VITALS
BODY MASS INDEX: 38.49 KG/M2 | SYSTOLIC BLOOD PRESSURE: 121 MMHG | OXYGEN SATURATION: 100 % | HEART RATE: 91 BPM | WEIGHT: 231 LBS | HEIGHT: 65 IN | DIASTOLIC BLOOD PRESSURE: 77 MMHG | TEMPERATURE: 97.3 F | RESPIRATION RATE: 18 BRPM

## 2023-06-23 DIAGNOSIS — R10.32 LLQ PAIN: ICD-10-CM

## 2023-06-23 DIAGNOSIS — E66.812 CLASS 2 SEVERE OBESITY DUE TO EXCESS CALORIES WITH SERIOUS COMORBIDITY AND BODY MASS INDEX (BMI) OF 38.0 TO 38.9 IN ADULT (H): ICD-10-CM

## 2023-06-23 DIAGNOSIS — Z12.4 CERVICAL CANCER SCREENING: ICD-10-CM

## 2023-06-23 DIAGNOSIS — R20.0 BILATERAL HAND NUMBNESS: ICD-10-CM

## 2023-06-23 DIAGNOSIS — R87.612 PAPANICOLAOU SMEAR OF CERVIX WITH LOW GRADE SQUAMOUS INTRAEPITHELIAL LESION (LGSIL): ICD-10-CM

## 2023-06-23 DIAGNOSIS — Z00.00 ANNUAL PHYSICAL EXAM: Primary | ICD-10-CM

## 2023-06-23 DIAGNOSIS — E66.01 CLASS 2 SEVERE OBESITY DUE TO EXCESS CALORIES WITH SERIOUS COMORBIDITY AND BODY MASS INDEX (BMI) OF 38.0 TO 38.9 IN ADULT (H): ICD-10-CM

## 2023-06-23 DIAGNOSIS — Z12.11 SCREEN FOR COLON CANCER: ICD-10-CM

## 2023-06-23 DIAGNOSIS — R73.03 PREDIABETES: ICD-10-CM

## 2023-06-23 DIAGNOSIS — R03.0 ELEVATED BLOOD PRESSURE READING WITHOUT DIAGNOSIS OF HYPERTENSION: ICD-10-CM

## 2023-06-23 LAB
CHOLEST SERPL-MCNC: 207 MG/DL
HBA1C MFR BLD: 5.5 % (ref 0–5.6)
HDLC SERPL-MCNC: 68 MG/DL
LDLC SERPL CALC-MCNC: 125 MG/DL
NONHDLC SERPL-MCNC: 139 MG/DL
TOTAL PROTEIN SERUM FOR ELP: 7 G/DL (ref 6.4–8.3)
TRIGL SERPL-MCNC: 71 MG/DL
TSH SERPL DL<=0.005 MIU/L-ACNC: 0.93 UIU/ML (ref 0.3–4.2)
VIT B12 SERPL-MCNC: 761 PG/ML (ref 232–1245)

## 2023-06-23 PROCEDURE — 83036 HEMOGLOBIN GLYCOSYLATED A1C: CPT | Performed by: PHYSICIAN ASSISTANT

## 2023-06-23 PROCEDURE — 90750 HZV VACC RECOMBINANT IM: CPT | Performed by: PHYSICIAN ASSISTANT

## 2023-06-23 PROCEDURE — 99213 OFFICE O/P EST LOW 20 MIN: CPT | Mod: 25 | Performed by: PHYSICIAN ASSISTANT

## 2023-06-23 PROCEDURE — 36415 COLL VENOUS BLD VENIPUNCTURE: CPT | Performed by: PHYSICIAN ASSISTANT

## 2023-06-23 PROCEDURE — G0124 SCREEN C/V THIN LAYER BY MD: HCPCS | Performed by: PATHOLOGY

## 2023-06-23 PROCEDURE — 91312 COVID-19 BIVALENT 12+ (PFIZER): CPT | Performed by: PHYSICIAN ASSISTANT

## 2023-06-23 PROCEDURE — 80061 LIPID PANEL: CPT | Performed by: PHYSICIAN ASSISTANT

## 2023-06-23 PROCEDURE — 90471 IMMUNIZATION ADMIN: CPT | Performed by: PHYSICIAN ASSISTANT

## 2023-06-23 PROCEDURE — 84165 PROTEIN E-PHORESIS SERUM: CPT

## 2023-06-23 PROCEDURE — 82607 VITAMIN B-12: CPT | Performed by: PHYSICIAN ASSISTANT

## 2023-06-23 PROCEDURE — 99000 SPECIMEN HANDLING OFFICE-LAB: CPT | Performed by: PHYSICIAN ASSISTANT

## 2023-06-23 PROCEDURE — G0145 SCR C/V CYTO,THINLAYER,RESCR: HCPCS | Performed by: PHYSICIAN ASSISTANT

## 2023-06-23 PROCEDURE — 84425 ASSAY OF VITAMIN B-1: CPT | Mod: 90 | Performed by: PHYSICIAN ASSISTANT

## 2023-06-23 PROCEDURE — 87624 HPV HI-RISK TYP POOLED RSLT: CPT | Performed by: PHYSICIAN ASSISTANT

## 2023-06-23 PROCEDURE — 0121A COVID-19 BIVALENT 12+ (PFIZER): CPT | Performed by: PHYSICIAN ASSISTANT

## 2023-06-23 PROCEDURE — 86334 IMMUNOFIX E-PHORESIS SERUM: CPT

## 2023-06-23 PROCEDURE — 84155 ASSAY OF PROTEIN SERUM: CPT | Performed by: PHYSICIAN ASSISTANT

## 2023-06-23 PROCEDURE — 99396 PREV VISIT EST AGE 40-64: CPT | Mod: 25 | Performed by: PHYSICIAN ASSISTANT

## 2023-06-23 PROCEDURE — 84443 ASSAY THYROID STIM HORMONE: CPT | Performed by: PHYSICIAN ASSISTANT

## 2023-06-23 NOTE — PROGRESS NOTES
SUBJECTIVE    Edith Reynolds is a 58 year old female who presents for an annual exam.    Other concerns today:  1. LLQ pain  Left lower quadrant or adnexal area off and on for a while.  Not necessarily pain but feels tight.  Symptoms feels like she pulled a muscle.  No problems with constipation.  Not too bothersome.    Patient Active Problem List    Diagnosis Date Noted     Class 2 severe obesity due to excess calories with serious comorbidity in adult (H) 2023     Priority: Medium     Papanicolaou smear of cervix with low grade squamous intraepithelial lesion (LGSIL) 2022     Priority: Medium     14 LSIL Pap  22 LSIL Pap, Neg HR HPV Plan [place] due bef 22 Left msg to call back. [employer] sent  22 Left msg or view previously released [facility] result note, pt viewed.  07/15/22 [place] [facility] NO ARIADNE Plan cotest in 6-12 months due 07/15/23. Provider released the results and recommendations to the pt through [employer], pt viewed.   23 Physical--notes added       Elevated blood pressure reading without diagnosis of hypertension 2022     Priority: Medium     Prediabetes 2022     Priority: Medium        Immunization History   Administered Date(s) Administered     COVID-19 Bivalent 12+ (Pfizer) 2023     COVID-19 MONOVALENT 12+ (Pfizer) 2021, 2021, 2022     Flu, Unspecified 2010     HepB 1994, 1994, 1994     Hepatitis B, Adult 2019, 2020     Influenza (H1N1) 2010     Influenza (IIV3) PF 2010     Influenza Vaccine >6 months (Alfuria,Fluzone) 2021, 2022     MMR 1995     Mantoux Tuberculin Skin Test 2012, 2012     TD,PF 7+ (Tenivac) 1995     TDAP (Adacel,Boostrix) 2008, 2022     Zoster recombinant adjuvanted (SHINGRIX) 2023       No LMP recorded (lmp unknown). Patient is postmenopausal.  OB History    Para Term  AB Living   5 0 0 0 2 0    SAB IAB Ectopic Multiple Live Births   0 2 0 0 0      # Outcome Date GA Lbr [place]anth/2nd Weight Sex Delivery Anes PTL Lv   5             4             3             2 IAB            1 IAB               Obstetric Comments   Menopause - 50.       No current outpatient medications on file.     No current facility-administered medications for this visit.       Past Medical History:   Diagnosis Date     Migraine variant 2022    Resolved after menopause.       Past Surgical History:   Procedure Laterality Date      SECTION       ENDOMETRIAL ABLATION      Dr. Margarita JIMENEZ CARPAL TUNNEL       TONSILLECTOMY       TUBAL LIGATION          Family History   Problem Relation Age of Onset     Breast Cancer Mother 75     Kidney failure Mother      Coronary Artery Disease Mother      Hypertension Mother      Heart Failure Mother      Cerebrovascular Disease Father 50     Hypertension Sister      No Known Problems Daughter      Rheumatic fever Son      No Known Problems Son      No Known Problems Half-Brother      Ovarian Cancer No family hx of             2023     8:07 AM   Additional Questions   Roomed by era   Accompanied by self     Healthy Habits:     Getting at least 3 servings of Calcium per day:  Yes    Bi-annual eye exam:  NO    Dental care twice a year:  Yes    Sleep apnea or symptoms of sleep apnea:  None    Diet:  Regular (no restrictions)    Frequency of exercise:  None    Taking medications regularly:  Yes    Medication side effects:  Not applicable    PHQ-2 Total Score: 0    Additional concerns today:  No    Today's PHQ-2 Score:       2023     7:50 AM   PHQ-2 (  Pfizer)   Q1: Little interest or pleasure in doing things 0   Q2: Feeling down, depressed or hopeless 0   PHQ-2 Score 0   Q1: Little interest or pleasure in doing things Not at all   Q2: Feeling down, depressed or hopeless Not at all   PHQ-2 Score 0     Social History     Tobacco Use     Smoking status: Never      Smokeless tobacco: Never   Substance Use Topics     Alcohol use: Yes     Comment: Occasional         6/19/2023    11:56 AM   Alcohol Use   Prescreen: >3 drinks/day or >7 drinks/week? No         6/20/2022    10:42 AM 6/19/2023    11:57 AM   Breast CA Risk Assessment (FHS-7)   Did any of your first-degree relatives have breast or ovarian cancer? Yes Yes   Did any of your relatives have bilateral breast cancer? No No   Did any man in your family have breast cancer? No No   Did any woman in your family have breast and ovarian cancer? No Yes   Did any woman in your family have breast cancer before age 50 y? No No   Do you have 2 or more relatives with breast and/or ovarian cancer? No No   Do you have 2 or more relatives with breast and/or bowel cancer? No No         Latest Ref Rng & Units 6/9/2022     9:01 AM 11/7/2014     9:25 AM   PAP / HPV   PAP  Low-grade squamous intraepithelial lesion (LSIL) encompassing HPV/mild dysplasia/CIN1  LSIL encompassing HPV/mild dysplasia/CIN1  Electronically signed by Mendoza Mclain MD on 11/19/2014 at  8:30 AM      HPV 16 DNA Negative Negative     HPV 18 DNA Negative Negative     Other HR HPV Negative Negative       Review of Systems   Constitutional: Negative for chills and fever.   HENT: Negative for congestion, ear pain, hearing loss and sore throat.    Eyes: Negative for pain and visual disturbance.   Respiratory: Negative for cough and shortness of breath.    Cardiovascular: Negative for chest pain, palpitations and peripheral edema.   Gastrointestinal: Negative for abdominal pain, constipation, diarrhea, heartburn, hematochezia and nausea.   Breasts:  Negative for tenderness, breast mass and discharge.   Genitourinary: Positive for pelvic pain. Negative for dysuria, frequency, genital sores, hematuria, urgency, vaginal bleeding and vaginal discharge.   Musculoskeletal: Negative for arthralgias, joint swelling and myalgias.   Skin: Negative for rash.   Neurological:  "Negative for dizziness, weakness, headaches and paresthesias.   Psychiatric/Behavioral: Negative for mood changes. The patient is not nervous/anxious.          OBJECTIVE    Vitals:    06/23/23 0807   BP: 121/77   BP Location: Left arm   Patient Position: Sitting   Cuff Size: Adult Large   Pulse: 91   Resp: 18   Temp: 97.3  F (36.3  C)   TempSrc: Temporal   SpO2: 100%   Weight: 104.8 kg (231 lb)   Height: 1.651 m (5' 5\")       Body mass index is 38.44 kg/m .    Physical Exam:  General: patient is alert and oriented x 3, in no apparent distress  HEENT, Thyroid, Lymphatic, Cardiac, Pulmonary, GI, Musculoskeletal, Skin, and Neuro exams were completed today and grossly normal  Breast exam: Deferred, patient declined exam  Genitourinary: External genitalia is normal in appearance, vaginal walls are healthy, cervix is fairly well visualized and normal in appearance, no significant discharge noted, pap taken without difficulty, bimanual exam was normal, no adnexal pain bilaterally, no cervical motion tenderness, no masses palpable    Recent Results (from the past 24 hour(s))   Hemoglobin A1c    Collection Time: 06/23/23  9:06 AM   Result Value Ref Range    Hemoglobin A1C 5.5 0.0 - 5.6 %     For labs pending.      ASSESSMENT and PLAN    1. Annual physical exam  Health maintenance is discussed with patient as appropriate for age and risk factors.  Pap smear completed today.  Pap RN to follow-up with results.  Colonoscopy ordered.  Screening labs ordered and I will follow-up with results.  Covid booster given today.  - Pap imaged thin layer screen with HPV - recommended age 30 - 65 years  - Lipid Profile  - Colonoscopy Screening  Referral; Future    2. Prediabetes  Chronic, stable.  A1c rechecked today is improved and no longer in the prediabetic range.  I will inform patient of results, and recommend we recheck this once a year, or sooner if other concerns or symptoms.  - Hemoglobin A1c  - Lipid Profile    3. " Papanicolaou smear of cervix with low grade squamous intraepithelial lesion (LGSIL)  Pap completed today.  Pap RN to follow-up with results.  - Pap imaged thin layer screen with HPV - recommended age 30 - 65 years    4. LLQ pain  Exam normal.  Bimanual exam normal, abdominal exam is normal as well.  No acute abdomen or pelvis.  We discussed monitoring versus pelvic ultrasound.  She would like to monitor for now.  We discussed reasons to follow-up sooner.    5. Bilateral hand numbness  Chronic, stable.  This is being managed by neurology.  Neurology had labs that they wanted ordered as below.  Labs are ordered and will be forwarded to ordering provider.  - Vitamin B1 whole blood  - Protein electrophoresis  - Protein Immunofixation Serum  - TSH with free T4 reflex  - Vitamin B12; Future  - Vitamin B12    6. Class 2 severe obesity due to excess calories with serious comorbidity and body mass index (BMI) of 38.0 to 38.9 in adult (H)  Screening labs ordered and I will follow-up with results.  Encouraged ongoing healthy eating and exercise.  - Hemoglobin A1c    7. Elevated blood pressure reading without diagnosis of hypertension  History of elevated blood pressures.  Blood pressure today is improved.  Continue to monitor this at future visits.  - Hemoglobin A1c      This dictation uses voice recognition software, which may contain typographical errors.

## 2023-06-25 PROBLEM — R20.0 BILATERAL HAND NUMBNESS: Status: ACTIVE | Noted: 2023-06-25

## 2023-06-26 LAB
ALBUMIN SERPL ELPH-MCNC: 4.1 G/DL (ref 3.7–5.1)
ALPHA1 GLOB SERPL ELPH-MCNC: 0.2 G/DL (ref 0.2–0.4)
ALPHA2 GLOB SERPL ELPH-MCNC: 0.8 G/DL (ref 0.5–0.9)
B-GLOBULIN SERPL ELPH-MCNC: 0.9 G/DL (ref 0.6–1)
GAMMA GLOB SERPL ELPH-MCNC: 1 G/DL (ref 0.7–1.6)
M PROTEIN SERPL ELPH-MCNC: 0.2 G/DL
PROT PATTERN SERPL ELPH-IMP: ABNORMAL
PROT PATTERN SERPL IFE-IMP: NORMAL
VIT B1 PYROPHOSHATE BLD-SCNC: 136 NMOL/L

## 2023-06-29 LAB
BKR LAB AP GYN ADEQUACY: ABNORMAL
BKR LAB AP GYN INTERPRETATION: ABNORMAL
BKR LAB AP HPV REFLEX: ABNORMAL
BKR LAB AP PREVIOUS ABNL DX: ABNORMAL
BKR LAB AP PREVIOUS ABNORMAL: ABNORMAL
PATH REPORT.COMMENTS IMP SPEC: ABNORMAL
PATH REPORT.COMMENTS IMP SPEC: ABNORMAL
PATH REPORT.RELEVANT HX SPEC: ABNORMAL

## 2023-07-03 ENCOUNTER — PATIENT OUTREACH (OUTPATIENT)
Dept: FAMILY MEDICINE | Facility: CLINIC | Age: 58
End: 2023-07-03
Payer: COMMERCIAL

## 2023-07-12 ENCOUNTER — ANCILLARY PROCEDURE (OUTPATIENT)
Dept: MAMMOGRAPHY | Facility: CLINIC | Age: 58
End: 2023-07-12
Attending: FAMILY MEDICINE
Payer: COMMERCIAL

## 2023-07-12 DIAGNOSIS — Z12.31 VISIT FOR SCREENING MAMMOGRAM: ICD-10-CM

## 2023-07-12 PROCEDURE — 77067 SCR MAMMO BI INCL CAD: CPT | Mod: TC | Performed by: STUDENT IN AN ORGANIZED HEALTH CARE EDUCATION/TRAINING PROGRAM

## 2023-09-08 ENCOUNTER — OFFICE VISIT (OUTPATIENT)
Dept: NEUROLOGY | Facility: CLINIC | Age: 58
End: 2023-09-08
Attending: PSYCHIATRY & NEUROLOGY
Payer: COMMERCIAL

## 2023-09-08 VITALS
SYSTOLIC BLOOD PRESSURE: 144 MMHG | DIASTOLIC BLOOD PRESSURE: 97 MMHG | BODY MASS INDEX: 38.44 KG/M2 | HEART RATE: 88 BPM | RESPIRATION RATE: 16 BRPM | WEIGHT: 231 LBS

## 2023-09-08 DIAGNOSIS — R77.8 ABNORMAL SPEP: ICD-10-CM

## 2023-09-08 DIAGNOSIS — R20.0 BILATERAL HAND NUMBNESS: ICD-10-CM

## 2023-09-08 DIAGNOSIS — G56.01 CARPAL TUNNEL SYNDROME OF RIGHT WRIST: ICD-10-CM

## 2023-09-08 DIAGNOSIS — R20.0 BILATERAL HAND NUMBNESS: Primary | ICD-10-CM

## 2023-09-08 DIAGNOSIS — R73.03 PRE-DIABETES: ICD-10-CM

## 2023-09-08 PROCEDURE — 95886 MUSC TEST DONE W/N TEST COMP: CPT | Mod: RT | Performed by: PSYCHIATRY & NEUROLOGY

## 2023-09-08 PROCEDURE — 99214 OFFICE O/P EST MOD 30 MIN: CPT | Performed by: PSYCHIATRY & NEUROLOGY

## 2023-09-08 PROCEDURE — 95911 NRV CNDJ TEST 9-10 STUDIES: CPT | Performed by: PSYCHIATRY & NEUROLOGY

## 2023-09-08 NOTE — PROCEDURES
[facility]     Formerly [facility]  [address] [place] [address], [address]  [place] [address]  Tel: [phone]  Fax: [phone]          Full Name: Edith Reynolds Gender: Female  Patient ID: [id number] YOB: 1965      Visit Date: 9/8/2023 10:25  Age: 58 Years 6 Months Old  Interpreted By: Raúl Rosa MD   Ref Dr.: Mirlande Rojas MD  Tech:    Height: 5 feet 4 inch  Reason for referral: Evaluate bilateral uppers. c/o tingling in both hands > 6 months. h/o left CTRs.       Motor NCS      Nerve / Sites Lat Amp Dist Marco    ms mV cm m/s   R Median - APB      Wrist 3.33 11.2 7       Elbow 6.88 10.7 22.5 64   L Median - APB      Wrist 2.97 10.2 7       Elbow 6.56 10.1 22.5 63   R Ulnar - ADM      Wrist 2.55 8.6 7       B.Elbow 5.52 8.1 19 64      A.Elbow 7.29 7.8 12 68       F  Wave      Nerve Fmin    ms   R Ulnar - ADM 25.26       Sensory NCS      Nerve / Sites Onset Lat Pk Lat Amp.2-3 Dist Marco Lat Diff    ms ms  V cm m/s ms   R Median - II (Antidr)      Wrist 2.60 3.49 38.6 13 50    L Median - II (Antidr)      Wrist 2.08 2.66 53.7 13 62    R Ulnar - V (Antidr)      Wrist 2.14 2.71 20.9 11 52    R Median, Ulnar - Transcarpal comparison      Median Palm 1.77 2.34 31.9 8 45       Ulnar Palm 1.25 1.82 18.0 8 64          0.52   L Median, Ulnar - Transcarpal comparison      Median Palm 1.46 1.88 114.0 8 55       Ulnar Palm 1.35 1.88 41.7 8 59          0.00       EMG Summary Table     Spontaneous MUAP Rcmt Note   Muscle Fib PSW Fasc IA # Amp Dur PPP Rate Type   R. Brachioradialis None None None N N N N N N N   R. Pronator teres None None None N N N N N N N   R. Biceps brachii None None None N N N N N N N   R. Deltoid None None None N N N N N N N   R. Triceps brachii None None None N N N N N N N   R. Flexor carpi ulnaris None None None N N N N N N N   R. First dorsal interosseous None None None N N N N N N N   R. Abductor pollicis brevis None None None N N N N  N N N   L. Brachioradialis None None None N N N N N N N   L. Pronator teres None None None N N N N N N N   L. Biceps brachii None None None N N N N N N N   L. Deltoid None None None N N N N N N N   L. Triceps brachii None None None N N N N N N N   L. First dorsal interosseous None None None N N N N N N N   L. Abductor pollicis brevis None None None N N N N N N N       SUMMARY  Nerve conduction and EMG study of bilateral upper extremities shows:    Normal right median nerve distal motor latency, amplitude and conduction velocity.  Normal left median nerve distal motor latency, amplitude and conduction velocity.  Normal right ulnar distal motor latency, amplitude and conduction velocity.  Normal bilateral median and right ulnar sensory SNAPs.  Mildly prolonged right median-ulnar transcarpal peak latency comparison.  Normal left median-ulnar transcarpal peak latency comparison.  Monopolar needle exam is normal.      CLINICAL INTERPRETATION:  This is an abnormal nerve conduction and EMG study.  The study is suggestive of a mild right median neuropathy (suspected carpal tunnel).  Further clinical correlation is needed.     Raúl Rosa MD  Neurologist  [facility] Neurology [facility]  Tel:- [phone]

## 2023-09-08 NOTE — NURSING NOTE
Chief Complaint   Patient presents with    Follow Up     EMG review     Nataly Tripathi MA,CMA,12:15 PM

## 2023-09-08 NOTE — PROGRESS NOTES
NEUROLOGY OUTPATIENT PROGRESS NOTE   Sep 8, 2023     CHIEF COMPLAINT/REASON FOR VISIT/REASON FOR CONSULT  Patient presents with:  Follow Up: EMG review     REASON FOR CONSULTATION-hand numbness    REFERRAL SOURCE  Dr. Mirlande Rojas  CC Dr. Mirlande Rojas    HISTORY OF PRESENT ILLNESS  Edith Reynolds is a 58 year old female seen today for evaluation of bilateral hand numbness.  She reports that in 2016/17 she had a EMG because of some numbness in her left hand and needed carpal tunnel surgery.  Over the last few months she has noticed bilateral hand numbness.  She has been dropping things with her left hand.  All 5 fingers are involved.  There is no numbness in the feet.  Denies any significant neck pain or shooting pain down the arms.  Has not gained weight.  Has had a diagnosis of prediabetes for the last 1 year.  She does work on a computer a lot and does sleep at night with the wrist pain.  Denies any damage to the elbows.    9/8/23  Patient returns today.  She has been using the braces as much as she can.  Notices some improvement in her symptoms.  Continues to have both hand numbness.  No weakness.  No neck pain or other symptoms.  Her diabetes numbers have also improved.  She has possibly lost some weight.    Previous history is reviewed and this is unchanged.    PAST MEDICAL/SURGICAL HISTORY  Past Medical History:   Diagnosis Date    Migraine variant 6/9/2022    Resolved after menopause.     Patient Active Problem List   Diagnosis    Elevated blood pressure reading without diagnosis of hypertension    Prediabetes    Papanicolaou smear of cervix with low grade squamous intraepithelial lesion (LGSIL)    Class 2 severe obesity due to excess calories with serious comorbidity in adult (H)    Bilateral hand numbness   Significant for prediabetes    FAMILY HISTORY  Family History   Problem Relation Age of Onset    Breast Cancer Mother 75    Kidney failure Mother     Coronary Artery Disease Mother     Hypertension  Mother     Heart Failure Mother     Cerebrovascular Disease Father 50    Hypertension Sister     No Known Problems Daughter     Rheumatic fever Son     No Known Problems Son     No Known Problems Half-Brother     Ovarian Cancer Niece 38    Colon Cancer No family hx of    Family history negative for neurological problems except for stroke in her father    SOCIAL HISTORY  Social History     Tobacco Use    Smoking status: Never    Smokeless tobacco: Never   Vaping Use    Vaping Use: Never used   Substance Use Topics    Alcohol use: Yes     Comment: Occasional    Drug use: Never       SYSTEMS REVIEW  Twelve-system ROS was done and other than the HPI this was negative.  Pertinent positives noted in the HPI.  No new concerns/issues.    MEDICATIONS  No current outpatient medications on file prior to visit.  No current facility-administered medications on file prior to visit.       PHYSICAL EXAMINATION  VITALS: BP (!) 144/97   Pulse 88   Resp 16   Wt 104.8 kg (231 lb)   LMP  (LMP Unknown)   BMI 38.44 kg/m    GENERAL: Healthy appearing, alert, no acute distress, normal habitus.  CARDIOVASCULAR: Extremities warm and well perfused. Pulses present.   NEUROLOGICAL:  Patient is awake and oriented to self, place and time.  Attention span is normal.  Memory is grossly intact.  Language is fluent and follows commands appropriately.  Appropriate fund of knowledge. Cranial nerves 2-12 are intact. There is no pronator drift.  Motor exam shows 5/5 strength in all extremities.  Tone is symmetric bilaterally in upper and lower extremities.  (Previously reflexes are symmetric and 2+ in upper extremities and lower extremities. Sensory exam is grossly intact to light touch, pin prick and vibration.)  Finger to nose and heel to shin is without dysmetria.  Romberg is negative.  Gait is normal and the patient is able to do tandem walk and walk on toes and heels.  Exam stable.  No weakness identified.    DIAGNOSTICS  EMG-2017  Showed left  median neuropathy.  Diagnosed to have carpal tunnel of the left wrist.    RELEVANT LABS      OUTSIDE RECORDS  Outside referral notes and chart notes were reviewed and pertinent information has been summarized (in addition to the HPI):-    Plastic surgery notes from 2017 were reviewed.  Neurology notes from 2017 also reviewed.      LABS    Component      Latest Ref Rng 6/23/2023  9:06 AM   Albumin Fraction      3.7 - 5.1 g/dL 4.1    Alpha 1 Fraction      0.2 - 0.4 g/dL 0.2    Alpha 2 Fraction      0.5 - 0.9 g/dL 0.8    Beta Fraction      0.6 - 1.0 g/dL 0.9    Gamma Fraction      0.7 - 1.6 g/dL 1.0    Monoclonal Peak      <=0.0 g/dL 0.2 (H)    ELP Interpretation: Small monoclonal protein (0.2 g/dL) seen in the beta fraction co migrating with C3 complement making accurate quantification not possible. See immunofixation report on same specimen. Pathologic significance requires clinical correlation. Michael Townsend MD    Vitamin B1 Whole Blood Level      70 - 180 nmol/L 136    Immunofixation ELP Monoclonal IgA immunoglobulin of lambda light chain type. Pathologic significance requires clinical correlation. Michael Townsend MD    Total Protein Serum for ELP      6.4 - 8.3 g/dL 7.0       Legend:  (H) High  Component      Latest Ref Rng 6/23/2023  9:06 AM   Hemoglobin A1C      0.0 - 5.6 % 5.5    TSH      0.30 - 4.20 uIU/mL 0.93    Vitamin B12      232 - 1,245 pg/mL 761        EMG  CLINICAL INTERPRETATION:  This is an abnormal nerve conduction and EMG study.  The study is suggestive of a mild right median neuropathy (suspected carpal tunnel).  Further clinical correlation is needed.     IMPRESSION/REPORT/PLAN  Pre-diabetes  Bilateral hand numbness-rule out bilateral carpal tunnel syndrome  History of left carpal tunnel surgery  Right carpal tunnel syndrome  Abnormal serum protein electrophoresis    This is a 58 year old female with history of left carpal tunnel surgery with new bilateral hand numbness.  Exam today is  noncontributory.  She does have risk factors of prediabetes and bending her wrist a lot at work and at nighttime.  EMG suggested mild right-sided carpal tunnel/median neuropathy.  Would recommend she continue using the braces and given more time to see if the symptoms completely resolved.  She does have risk factors of prediabetes though this does not look improved on the blood work as well.  Blood work did show slightly abnormal serum for electrophoresis and we will recheck that in 6 months to rule out false positive.    Could consider MRI C-spine if she has worsening symptoms in the future.    Return back in 6 months.    -     Miscellaneous Order for DME - ONLY FOR DME-bilateral carpal tunnel brace  -     Protein electrophoresis; Future  -     Protein Immunofixation Serum; Future    Return in about 6 months (around 3/8/2024) for In-Clinic Visit (must), After testing.    Over 30 minutes were spent coordinating the care for the patient on the day of the encounter.  This includes previsit, during visit and post visit activities as documented above.  Consent patient.  Reviewing blood work/EMG.  Testing ordered.  Multiple problems reviewed/addressed.  (Activities include but not inclusive of reviewing chart, reviewing outside records, reviewing labs and imaging study results as well as the images, patient visit time including getting history and exam,  use if applicable, review of test results with the patient and coming up with a plan in a shared model, counseling patient and family, education and answering patient questions, EMR , EMR diagnosis entry and problem list management, medication reconciliation and prescription management if applicable, paperwork if applicable, printing documents and documentation of the visit activities.)        Raúl Rosa MD  Neurologist  [facility] Neurology [facility]  Tel:- [phone]    This note was dictated using voice recognition software.   Any grammatical or context distortions are unintentional and inherent to the software.

## 2023-09-08 NOTE — LETTER
9/8/2023         RE: Edith Reynolds  [address] [facility] [address]        Dear Colleague,    Thank you for referring your patient, Edith Reynolds, to the [facility] NEUROLOGY [facility]IC [place]. Please see a copy of my visit note below.    NEUROLOGY OUTPATIENT PROGRESS NOTE   Sep 8, 2023     CHIEF COMPLAINT/REASON FOR VISIT/REASON FOR CONSULT  Patient presents with:  Follow Up: EMG review     REASON FOR CONSULTATION-hand numbness    REFERRAL SOURCE  Dr. Mirlande Rojas  CC Dr. Mirlande Rojas    HISTORY OF PRESENT ILLNESS  Edith Reynolds is a 58 year old female seen today for evaluation of bilateral hand numbness.  She reports that in 2016/17 she had a EMG because of some numbness in her left hand and needed carpal tunnel surgery.  Over the last few months she has noticed bilateral hand numbness.  She has been dropping things with her left hand.  All 5 fingers are involved.  There is no numbness in the feet.  Denies any significant neck pain or shooting pain down the arms.  Has not gained weight.  Has had a diagnosis of prediabetes for the last 1 year.  She does work on a computer a lot and does sleep at night with the wrist pain.  Denies any damage to the elbows.    9/8/23  Patient returns today.  She has been using the braces as much as she can.  Notices some improvement in her symptoms.  Continues to have both hand numbness.  No weakness.  No neck pain or other symptoms.  Her diabetes numbers have also improved.  She has possibly lost some weight.    Previous history is reviewed and this is unchanged.    PAST MEDICAL/SURGICAL HISTORY  Past Medical History:   Diagnosis Date     Migraine variant 6/9/2022    Resolved after menopause.     Patient Active Problem List   Diagnosis     Elevated blood pressure reading without diagnosis of hypertension     Prediabetes     Papanicolaou smear of cervix with low grade squamous intraepithelial lesion (LGSIL)     Class 2 severe obesity due to excess  calories with serious comorbidity in adult (H)     Bilateral hand numbness   Significant for prediabetes    FAMILY HISTORY  Family History   Problem Relation Age of Onset     Breast Cancer Mother 75     Kidney failure Mother      Coronary Artery Disease Mother      Hypertension Mother      Heart Failure Mother      Cerebrovascular Disease Father 50     Hypertension Sister      No Known Problems Daughter      Rheumatic fever Son      No Known Problems Son      No Known Problems Half-Brother      Ovarian Cancer Niece 38     Colon Cancer No family hx of    Family history negative for neurological problems except for stroke in her father    SOCIAL HISTORY  Social History     Tobacco Use     Smoking status: Never     Smokeless tobacco: Never   Vaping Use     Vaping Use: Never used   Substance Use Topics     Alcohol use: Yes     Comment: Occasional     Drug use: Never       SYSTEMS REVIEW  Twelve-system ROS was done and other than the HPI this was negative.  Pertinent positives noted in the HPI.  No new concerns/issues.    MEDICATIONS  No current outpatient medications on file prior to visit.  No current facility-administered medications on file prior to visit.       PHYSICAL EXAMINATION  VITALS: BP (!) 144/97   Pulse 88   Resp 16   Wt 104.8 kg (231 lb)   LMP  (LMP Unknown)   BMI 38.44 kg/m    GENERAL: Healthy appearing, alert, no acute distress, normal habitus.  CARDIOVASCULAR: Extremities warm and well perfused. Pulses present.   NEUROLOGICAL:  Patient is awake and oriented to self, place and time.  Attention span is normal.  Memory is grossly intact.  Language is fluent and follows commands appropriately.  Appropriate fund of knowledge. Cranial nerves 2-12 are intact. There is no pronator drift.  Motor exam shows 5/5 strength in all extremities.  Tone is symmetric bilaterally in upper and lower extremities.  (Previously reflexes are symmetric and 2+ in upper extremities and lower extremities. Sensory exam is  grossly intact to light touch, pin prick and vibration.)  Finger to nose and heel to shin is without dysmetria.  Romberg is negative.  Gait is normal and the patient is able to do tandem walk and walk on toes and heels.  Exam stable.  No weakness identified.    DIAGNOSTICS  EMG-2017  Showed left median neuropathy.  Diagnosed to have carpal tunnel of the left wrist.    RELEVANT LABS      OUTSIDE RECORDS  Outside referral notes and chart notes were reviewed and pertinent information has been summarized (in addition to the HPI):-    Plastic surgery notes from 2017 were reviewed.  Neurology notes from 2017 also reviewed.      LABS    Component      Latest Ref Rng 6/23/2023  9:06 AM   Albumin Fraction      3.7 - 5.1 g/dL 4.1    Alpha 1 Fraction      0.2 - 0.4 g/dL 0.2    Alpha 2 Fraction      0.5 - 0.9 g/dL 0.8    Beta Fraction      0.6 - 1.0 g/dL 0.9    Gamma Fraction      0.7 - 1.6 g/dL 1.0    Monoclonal Peak      <=0.0 g/dL 0.2 (H)    ELP Interpretation: Small monoclonal protein (0.2 g/dL) seen in the beta fraction co migrating with C3 complement making accurate quantification not possible. See immunofixation report on same specimen. Pathologic significance requires clinical correlation. Michael Townsend MD    Vitamin B1 Whole Blood Level      70 - 180 nmol/L 136    Immunofixation ELP Monoclonal IgA immunoglobulin of lambda light chain type. Pathologic significance requires clinical correlation. Michael Townsend MD    Total Protein Serum for ELP      6.4 - 8.3 g/dL 7.0       Legend:  (H) High  Component      Latest Ref Rng 6/23/2023  9:06 AM   Hemoglobin A1C      0.0 - 5.6 % 5.5    TSH      0.30 - 4.20 uIU/mL 0.93    Vitamin B12      232 - 1,245 pg/mL 761        EMG  CLINICAL INTERPRETATION:  This is an abnormal nerve conduction and EMG study.  The study is suggestive of a mild right median neuropathy (suspected carpal tunnel).  Further clinical correlation is needed.      IMPRESSION/REPORT/PLAN  Pre-diabetes  Bilateral hand numbness-rule out bilateral carpal tunnel syndrome  History of left carpal tunnel surgery  Right carpal tunnel syndrome  Abnormal serum protein electrophoresis    This is a 58 year old female with history of left carpal tunnel surgery with new bilateral hand numbness.  Exam today is noncontributory.  She does have risk factors of prediabetes and bending her wrist a lot at work and at nighttime.  EMG suggested mild right-sided carpal tunnel/median neuropathy.  Would recommend she continue using the braces and given more time to see if the symptoms completely resolved.  She does have risk factors of prediabetes though this does not look improved on the blood work as well.  Blood work did show slightly abnormal serum for electrophoresis and we will recheck that in 6 months to rule out false positive.    Could consider MRI C-spine if she has worsening symptoms in the future.    Return back in 6 months.    -     Miscellaneous Order for DME - ONLY FOR DME-bilateral carpal tunnel brace  -     Protein electrophoresis; Future  -     Protein Immunofixation Serum; Future    Return in about 6 months (around 3/8/2024) for In-Clinic Visit (must), After testing.    Over 30 minutes were spent coordinating the care for the patient on the day of the encounter.  This includes previsit, during visit and post visit activities as documented above.  Consent patient.  Reviewing blood work/EMG.  Testing ordered.  Multiple problems reviewed/addressed.  (Activities include but not inclusive of reviewing chart, reviewing outside records, reviewing labs and imaging study results as well as the images, patient visit time including getting history and exam,  use if applicable, review of test results with the patient and coming up with a plan in a shared model, counseling patient and family, education and answering patient questions, EMR , EMR diagnosis entry and  problem list management, medication reconciliation and prescription management if applicable, paperwork if applicable, printing documents and documentation of the visit activities.)        Raúl Rosa MD  Neurologist  [facility] [place] [facility]  Tel:- [phone]    This note was dictated using voice recognition software.  Any grammatical or context distortions are unintentional and inherent to the software.      Again, thank you for allowing me to participate in the care of your patient.        Sincerely,        Raúl Rosa MD

## 2023-09-08 NOTE — LETTER
9/8/2023         RE: Edith Reynolds  [address] [facility] [address]        Dear Colleague,    Thank you for referring your patient, Edith Reynolds, to the [facility] NEUROLOGY CLINIC [place]. Please see a copy of my visit note below.    See procedure note.       Again, thank you for allowing me to participate in the care of your patient.        Sincerely,        Raúl Rosa MD

## 2024-05-24 ENCOUNTER — PATIENT OUTREACH (OUTPATIENT)
Dept: CARE COORDINATION | Facility: CLINIC | Age: 59
End: 2024-05-24
Payer: COMMERCIAL

## 2024-06-07 ENCOUNTER — PATIENT OUTREACH (OUTPATIENT)
Dept: CARE COORDINATION | Facility: CLINIC | Age: 59
End: 2024-06-07
Payer: COMMERCIAL

## 2024-06-10 ENCOUNTER — PATIENT OUTREACH (OUTPATIENT)
Dept: FAMILY MEDICINE | Facility: CLINIC | Age: 59
End: 2024-06-10
Payer: COMMERCIAL

## 2024-06-10 PROBLEM — R87.612 PAPANICOLAOU SMEAR OF CERVIX WITH LOW GRADE SQUAMOUS INTRAEPITHELIAL LESION (LGSIL): Status: ACTIVE | Noted: 2022-06-16

## 2024-07-31 NOTE — TELEPHONE ENCOUNTER
Carlos Shah,   Patient is lost to pap tracking follow-up. Attempts to contact pt have been made per reminder process and there has been no reply and/or no appt scheduled.     Pap Hx:  11/07/14 LSIL Pap  06/9/22 LSIL Pap, Neg HR HPV Plan Lucas due bef 09/09/22   07/15/22 Lucas ECC NO ARIADNE Plan cotest in 6-12 months due 07/15/23. Provider released the results and recommendations to the pt through [employer], pt viewed.   06/23/23 LSIL pap, Neg HPV. Plan: cotest in 1 year.  Results and recommendations released to the pt through [employer]. Seen by patient Edith LACKEY Reynolds on 7/4/2023  8:05 AM  06/10/24 Reminder [facility]  07/3/24 Reminder call-lm  07/31/24 Lost to follow-up for pap tracking, camilo routed to provider

## 2024-09-07 ENCOUNTER — HEALTH MAINTENANCE LETTER (OUTPATIENT)
Age: 59
End: 2024-09-07

## 2025-06-12 ENCOUNTER — PATIENT OUTREACH (OUTPATIENT)
Dept: CARE COORDINATION | Facility: CLINIC | Age: 60
End: 2025-06-12
Payer: COMMERCIAL